# Patient Record
Sex: FEMALE | Race: WHITE | Employment: UNEMPLOYED | URBAN - METROPOLITAN AREA
[De-identification: names, ages, dates, MRNs, and addresses within clinical notes are randomized per-mention and may not be internally consistent; named-entity substitution may affect disease eponyms.]

---

## 2018-05-07 ENCOUNTER — HOSPITAL ENCOUNTER (OUTPATIENT)
Dept: MAMMOGRAPHY | Age: 38
Discharge: HOME | End: 2018-05-08
Admitting: FAMILY MEDICINE

## 2018-05-07 ENCOUNTER — HOSPITAL ENCOUNTER (OUTPATIENT)
Dept: ULTRASOUND IMAGING | Age: 38
Discharge: HOME | End: 2018-05-07
Admitting: FAMILY MEDICINE

## 2018-05-07 DIAGNOSIS — N64.4 MASTODYNIA: ICD-10-CM

## 2018-05-07 DIAGNOSIS — N64.4 BREAST PAIN: ICD-10-CM

## 2020-03-22 ENCOUNTER — HOSPITAL ENCOUNTER (EMERGENCY)
Age: 40
Discharge: HOME | End: 2020-03-23
Attending: EMERGENCY MEDICINE
Payer: COMMERCIAL

## 2020-03-22 VITALS
WEIGHT: 250 LBS | TEMPERATURE: 98.5 F | HEART RATE: 54 BPM | HEIGHT: 63 IN | RESPIRATION RATE: 15 BRPM | OXYGEN SATURATION: 97 % | DIASTOLIC BLOOD PRESSURE: 68 MMHG | BODY MASS INDEX: 44.3 KG/M2 | SYSTOLIC BLOOD PRESSURE: 93 MMHG

## 2020-03-22 LAB
A/G RATIO: 1.3 (ref 1.1–2.2)
ALBUMIN SERPL-MCNC: 4.1 G/DL (ref 3.4–5)
ALP BLD-CCNC: 54 U/L (ref 40–129)
ALT SERPL-CCNC: 9 U/L (ref 10–40)
ANION GAP SERPL CALCULATED.3IONS-SCNC: 14 MMOL/L (ref 3–16)
AST SERPL-CCNC: 19 U/L (ref 15–37)
BASOPHILS ABSOLUTE: 0.1 K/UL (ref 0–0.2)
BASOPHILS RELATIVE PERCENT: 0.9 %
BILIRUB SERPL-MCNC: <0.2 MG/DL (ref 0–1)
BUN BLDV-MCNC: 11 MG/DL (ref 7–20)
CALCIUM SERPL-MCNC: 9.5 MG/DL (ref 8.3–10.6)
CHLORIDE BLD-SCNC: 106 MMOL/L (ref 99–110)
CO2: 19 MMOL/L (ref 21–32)
CREAT SERPL-MCNC: 0.6 MG/DL (ref 0.6–1.1)
EOSINOPHILS ABSOLUTE: 0.2 K/UL (ref 0–0.6)
EOSINOPHILS RELATIVE PERCENT: 1.8 %
GFR AFRICAN AMERICAN: >60
GFR NON-AFRICAN AMERICAN: >60
GLOBULIN: 3.1 G/DL
GLUCOSE BLD-MCNC: 136 MG/DL (ref 70–99)
HCG QUALITATIVE: NEGATIVE
HCT VFR BLD CALC: 47.9 % (ref 36–48)
HEMOGLOBIN: 16.6 G/DL (ref 12–16)
LYMPHOCYTES ABSOLUTE: 4.3 K/UL (ref 1–5.1)
LYMPHOCYTES RELATIVE PERCENT: 33.7 %
MCH RBC QN AUTO: 31.9 PG (ref 26–34)
MCHC RBC AUTO-ENTMCNC: 34.7 G/DL (ref 31–36)
MCV RBC AUTO: 92 FL (ref 80–100)
MONOCYTES ABSOLUTE: 0.7 K/UL (ref 0–1.3)
MONOCYTES RELATIVE PERCENT: 5.4 %
NEUTROPHILS ABSOLUTE: 7.4 K/UL (ref 1.7–7.7)
NEUTROPHILS RELATIVE PERCENT: 58.2 %
PDW BLD-RTO: 13 % (ref 12.4–15.4)
PLATELET # BLD: 307 K/UL (ref 135–450)
PMV BLD AUTO: 9.1 FL (ref 5–10.5)
POTASSIUM SERPL-SCNC: 3.9 MMOL/L (ref 3.5–5.1)
RBC # BLD: 5.21 M/UL (ref 4–5.2)
SODIUM BLD-SCNC: 139 MMOL/L (ref 136–145)
TOTAL PROTEIN: 7.2 G/DL (ref 6.4–8.2)
TROPONIN: <0.01 NG/ML
WBC # BLD: 12.7 K/UL (ref 4–11)

## 2020-03-22 PROCEDURE — 2580000003 HC RX 258: Performed by: EMERGENCY MEDICINE

## 2020-03-22 PROCEDURE — 93005 ELECTROCARDIOGRAM TRACING: CPT | Performed by: EMERGENCY MEDICINE

## 2020-03-22 PROCEDURE — 84703 CHORIONIC GONADOTROPIN ASSAY: CPT

## 2020-03-22 PROCEDURE — 99285 EMERGENCY DEPT VISIT HI MDM: CPT

## 2020-03-22 PROCEDURE — 85025 COMPLETE CBC W/AUTO DIFF WBC: CPT

## 2020-03-22 PROCEDURE — 80053 COMPREHEN METABOLIC PANEL: CPT

## 2020-03-22 PROCEDURE — 96360 HYDRATION IV INFUSION INIT: CPT

## 2020-03-22 PROCEDURE — 84484 ASSAY OF TROPONIN QUANT: CPT

## 2020-03-22 PROCEDURE — 36415 COLL VENOUS BLD VENIPUNCTURE: CPT

## 2020-03-22 RX ORDER — 0.9 % SODIUM CHLORIDE 0.9 %
1000 INTRAVENOUS SOLUTION INTRAVENOUS ONCE
Status: DISCONTINUED | OUTPATIENT
Start: 2020-03-23 | End: 2020-03-22

## 2020-03-22 RX ORDER — 0.9 % SODIUM CHLORIDE 0.9 %
1000 INTRAVENOUS SOLUTION INTRAVENOUS ONCE
Status: COMPLETED | OUTPATIENT
Start: 2020-03-22 | End: 2020-03-23

## 2020-03-22 RX ADMIN — SODIUM CHLORIDE 1000 ML: 900 INJECTION, SOLUTION INTRAVENOUS at 22:48

## 2020-03-22 ASSESSMENT — PAIN DESCRIPTION - LOCATION: LOCATION: ABDOMEN

## 2020-03-22 ASSESSMENT — PAIN DESCRIPTION - FREQUENCY: FREQUENCY: CONTINUOUS

## 2020-03-22 ASSESSMENT — PAIN DESCRIPTION - PAIN TYPE: TYPE: ACUTE PAIN

## 2020-03-22 ASSESSMENT — PAIN DESCRIPTION - ONSET: ONSET: SUDDEN

## 2020-03-22 ASSESSMENT — PAIN SCALES - GENERAL: PAINLEVEL_OUTOF10: 1

## 2020-03-22 ASSESSMENT — PAIN DESCRIPTION - DESCRIPTORS: DESCRIPTORS: CRAMPING

## 2020-03-23 LAB
EKG ATRIAL RATE: 55 BPM
EKG DIAGNOSIS: NORMAL
EKG P AXIS: 55 DEGREES
EKG P-R INTERVAL: 180 MS
EKG Q-T INTERVAL: 476 MS
EKG QRS DURATION: 96 MS
EKG QTC CALCULATION (BAZETT): 455 MS
EKG R AXIS: -51 DEGREES
EKG T AXIS: 56 DEGREES
EKG VENTRICULAR RATE: 55 BPM

## 2020-03-23 PROCEDURE — 93010 ELECTROCARDIOGRAM REPORT: CPT | Performed by: INTERNAL MEDICINE

## 2020-03-23 NOTE — ED NOTES
RN rounded on pt. IVF started and pt given warm blanket. VSS and as charted, and pt remains on monitor. Lights dimmed in room. Pt has no further needs at this time. Pt resting in bed, call light within reach. Pt denies any questions.       Rashmi Foster RN  03/22/20 1940

## 2020-03-24 NOTE — ED PROVIDER NOTES
Vomiting       REVIEW OF SYSTEMS  10 systems reviewed, pertinent positives per HPI otherwise noted to be negative. PHYSICAL EXAM  BP 93/68   Pulse 54   Temp 98.5 °F (36.9 °C) (Oral)   Resp 15   Ht 5' 3\" (1.6 m)   Wt 250 lb (113.4 kg)   LMP 03/22/2020   SpO2 97%   BMI 44.29 kg/m²    Physical exam:  General appearance: awake and cooperative. No distress. Non toxic appearing. Skin: Warm and dry. No rashes or lesions. HENT: Normocephalic. Atraumatic. Mucus membranes are dry. Neck: supple. Normal ROM. Eyes: WILLIAM. EOM intact. Heart: RRR. No murmurs. Lungs: Respirations unlabored. CTAB. No wheezes, rales, or rhonchi. Good air exchange  Abdomen: No tenderness. Soft. Non distended. No peritoneal signs. Musculoskeletal: No extremity edema. Compartments soft. No deformity. No tenderness in the extremities. All extremities neurovascularly intact. Radial, Dp, and PT pulses +2/4 bilaterally  Neurological: Alert and oriented. Strength 5/5 in UE and LE bilaterally. Sensation intact x 4. No aphasia or dysarthria. CN 2-12 intact. No facial droop. No limb or gait ataxia. Psychiatric: Normal mood and affect. LABS  I have reviewed all labs for this visit.    Results for orders placed or performed during the hospital encounter of 03/22/20   CBC auto differential   Result Value Ref Range    WBC 12.7 (H) 4.0 - 11.0 K/uL    RBC 5.21 (H) 4.00 - 5.20 M/uL    Hemoglobin 16.6 (H) 12.0 - 16.0 g/dL    Hematocrit 47.9 36.0 - 48.0 %    MCV 92.0 80.0 - 100.0 fL    MCH 31.9 26.0 - 34.0 pg    MCHC 34.7 31.0 - 36.0 g/dL    RDW 13.0 12.4 - 15.4 %    Platelets 867 558 - 613 K/uL    MPV 9.1 5.0 - 10.5 fL    Neutrophils % 58.2 %    Lymphocytes % 33.7 %    Monocytes % 5.4 %    Eosinophils % 1.8 %    Basophils % 0.9 %    Neutrophils Absolute 7.4 1.7 - 7.7 K/uL    Lymphocytes Absolute 4.3 1.0 - 5.1 K/uL    Monocytes Absolute 0.7 0.0 - 1.3 K/uL    Eosinophils Absolute 0.2 0.0 - 0.6 K/uL    Basophils Absolute 0.1 0.0 - 0.2 K/uL Comprehensive metabolic panel   Result Value Ref Range    Sodium 139 136 - 145 mmol/L    Potassium 3.9 3.5 - 5.1 mmol/L    Chloride 106 99 - 110 mmol/L    CO2 19 (L) 21 - 32 mmol/L    Anion Gap 14 3 - 16    Glucose 136 (H) 70 - 99 mg/dL    BUN 11 7 - 20 mg/dL    CREATININE 0.6 0.6 - 1.1 mg/dL    GFR Non-African American >60 >60    GFR African American >60 >60    Calcium 9.5 8.3 - 10.6 mg/dL    Total Protein 7.2 6.4 - 8.2 g/dL    Alb 4.1 3.4 - 5.0 g/dL    Albumin/Globulin Ratio 1.3 1.1 - 2.2    Total Bilirubin <0.2 0.0 - 1.0 mg/dL    Alkaline Phosphatase 54 40 - 129 U/L    ALT 9 (L) 10 - 40 U/L    AST 19 15 - 37 U/L    Globulin 3.1 g/dL   Troponin   Result Value Ref Range    Troponin <0.01 <0.01 ng/mL   hCG, serum, qualitative   Result Value Ref Range    hCG Qual Negative Detects HCG level >10 MIU/mL   EKG 12 Lead   Result Value Ref Range    Ventricular Rate 55 BPM    Atrial Rate 55 BPM    P-R Interval 180 ms    QRS Duration 96 ms    Q-T Interval 476 ms    QTc Calculation (Bazett) 455 ms    P Axis 55 degrees    R Axis -51 degrees    T Axis 56 degrees    Diagnosis       Sinus bradycardiaLeft axis deviationAbnormal ECGWhen compared with ECG of 12-SEP-2014 07:15,No significant change was foundConfirmed by Morris Lee MD, [address] ([id number]) on 3/23/2020 5:37:34 AM       ECG  The Ekg interpreted by me shows  sinus bradycardia, rate=55   Axis is   Left axis deviation  QTc is  within an acceptable range  Intervals and Durations are unremarkable. ST Segments: no acute change  No significant change from prior EKG dated 9/12/14    ED COURSE/[facility]  Patient seen and evaluated. Old records reviewed. Labs and imaging reviewed and results discussed with patient. Patient presents with episode of syncope. Her vital signs are within normal limits, she is afebrile. Blood pressure within normal limits and she is not tachycardic. She is nontoxic-appearing on exam.  She is fully neurologically intact.  Based on history, it does sound as